# Patient Record
Sex: MALE | URBAN - METROPOLITAN AREA
[De-identification: names, ages, dates, MRNs, and addresses within clinical notes are randomized per-mention and may not be internally consistent; named-entity substitution may affect disease eponyms.]

---

## 2018-06-29 NOTE — PATIENT DISCUSSION
Pt is moving [facility] in less than one month. He pref to wait until after move to proceed c cat sx. Signed record release and will req records when he finds a new provider. May have sx with [facility].

## 2019-01-24 ENCOUNTER — IMPORTED ENCOUNTER (OUTPATIENT)
Dept: URBAN - METROPOLITAN AREA CLINIC 9 | Facility: CLINIC | Age: 44
End: 2019-01-24

## 2019-02-08 ENCOUNTER — IMPORTED ENCOUNTER (OUTPATIENT)
Dept: URBAN - METROPOLITAN AREA CLINIC 9 | Facility: CLINIC | Age: 44
End: 2019-02-08

## 2019-03-29 ENCOUNTER — IMPORTED ENCOUNTER (OUTPATIENT)
Dept: URBAN - METROPOLITAN AREA CLINIC 9 | Facility: CLINIC | Age: 44
End: 2019-03-29

## 2021-10-16 ASSESSMENT — TONOMETRY
OS_IOP_MMHG: 17
OD_IOP_MMHG: 17

## 2021-10-16 ASSESSMENT — VISUAL ACUITY
OS_CC: 20/20 SN
OD_CC: 20/20 SN
OD_CC: 20/20 SN
OS_CC: 20/20 SN
OS_CC: 20/20 SN
OD_CC: 20/20 SN